# Patient Record
Sex: FEMALE | Race: WHITE | NOT HISPANIC OR LATINO
[De-identification: names, ages, dates, MRNs, and addresses within clinical notes are randomized per-mention and may not be internally consistent; named-entity substitution may affect disease eponyms.]

---
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
## 2024-05-30 ENCOUNTER — LABORATORY RESULT (OUTPATIENT)
Age: 19
End: 2024-05-30

## 2024-05-30 ENCOUNTER — APPOINTMENT (OUTPATIENT)
Dept: PEDIATRICS | Facility: CLINIC | Age: 19
End: 2024-05-30
Payer: COMMERCIAL

## 2024-05-30 VITALS — TEMPERATURE: 98.2 F | WEIGHT: 107.6 LBS

## 2024-05-30 PROCEDURE — 99213 OFFICE O/P EST LOW 20 MIN: CPT

## 2024-05-30 NOTE — DISCUSSION/SUMMARY
[FreeTextEntry1] : Encourage PO intake  Trial Motrin or Advil  Cool compress PRN  Labs  Follow up PRN
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
## 2024-06-01 LAB
25(OH)D3 SERPL-MCNC: 10.2 NG/ML
ALBUMIN SERPL ELPH-MCNC: 4.7 G/DL
ALP BLD-CCNC: 72 U/L
ALT SERPL-CCNC: 15 U/L
ANION GAP SERPL CALC-SCNC: 14 MMOL/L
ASO AB SER LA-ACNC: 81 IU/ML
AST SERPL-CCNC: 23 U/L
BASOPHILS # BLD AUTO: 0.05 K/UL
BASOPHILS NFR BLD AUTO: 0.5 %
BILIRUB SERPL-MCNC: 0.4 MG/DL
BUN SERPL-MCNC: 10 MG/DL
CALCIUM SERPL-MCNC: 9.8 MG/DL
CHLORIDE SERPL-SCNC: 102 MMOL/L
CHOLEST SERPL-MCNC: 153 MG/DL
CO2 SERPL-SCNC: 23 MMOL/L
CREAT SERPL-MCNC: 0.71 MG/DL
EBV EA AB SER IA-ACNC: <5 U/ML
EBV EA AB TITR SER IF: POSITIVE
EBV EA IGG SER QL IA: 323 U/ML
EBV EA IGG SER-ACNC: NEGATIVE
EBV EA IGM SER IA-ACNC: NEGATIVE
EBV PATRN SPEC IB-IMP: NORMAL
EBV VCA IGG SER IA-ACNC: 203 U/ML
EBV VCA IGM SER QL IA: <10 U/ML
EGFR: 126 ML/MIN/1.73M2
EOSINOPHIL # BLD AUTO: 0.38 K/UL
EOSINOPHIL NFR BLD AUTO: 4.1 %
EPSTEIN-BARR VIRUS CAPSID ANTIGEN IGG: POSITIVE
ESTIMATED AVERAGE GLUCOSE: 108 MG/DL
GLUCOSE SERPL-MCNC: 79 MG/DL
HBA1C MFR BLD HPLC: 5.4 %
HCT VFR BLD CALC: 43.2 %
HDLC SERPL-MCNC: 41 MG/DL
HGB BLD-MCNC: 13.4 G/DL
IMM GRANULOCYTES NFR BLD AUTO: 0.3 %
LDLC SERPL CALC-MCNC: 98 MG/DL
LYMPHOCYTES # BLD AUTO: 2.45 K/UL
LYMPHOCYTES NFR BLD AUTO: 26.7 %
MAN DIFF?: NORMAL
MCHC RBC-ENTMCNC: 26.9 PG
MCHC RBC-ENTMCNC: 31 GM/DL
MCV RBC AUTO: 86.6 FL
MONOCYTES # BLD AUTO: 0.45 K/UL
MONOCYTES NFR BLD AUTO: 4.9 %
NEUTROPHILS # BLD AUTO: 5.8 K/UL
NEUTROPHILS NFR BLD AUTO: 63.5 %
NONHDLC SERPL-MCNC: 112 MG/DL
PLATELET # BLD AUTO: 368 K/UL
POTASSIUM SERPL-SCNC: 4.6 MMOL/L
PROT SERPL-MCNC: 7.8 G/DL
RBC # BLD: 4.99 M/UL
RBC # FLD: 12.7 %
SODIUM SERPL-SCNC: 139 MMOL/L
T4 FREE SERPL-MCNC: 1.4 NG/DL
T4 SERPL-MCNC: 8.8 UG/DL
TRIGL SERPL-MCNC: 69 MG/DL
TSH SERPL-ACNC: 2.27 UIU/ML
WBC # FLD AUTO: 9.16 K/UL

## 2024-06-03 LAB — ANA SER IF-ACNC: NEGATIVE

## 2024-06-20 ENCOUNTER — APPOINTMENT (OUTPATIENT)
Dept: PEDIATRIC ORTHOPEDIC SURGERY | Facility: CLINIC | Age: 19
End: 2024-06-20
Payer: COMMERCIAL

## 2024-06-20 DIAGNOSIS — M25.50 PAIN IN UNSPECIFIED JOINT: ICD-10-CM

## 2024-06-20 PROCEDURE — 99203 OFFICE O/P NEW LOW 30 MIN: CPT

## 2024-06-21 PROBLEM — M25.50 JOINT PAIN: Status: ACTIVE | Noted: 2024-05-30

## 2024-06-21 NOTE — ASSESSMENT
[FreeTextEntry1] : 19 yo F with shoulder, wrist, hip, and knee joint pains and positive Mayda Cronin results 6/1/24   Clinical findings were reviewed at length with the patient. Given my examination, at this time, I do not believe that the source of her joint pain is of an orthopedic nature. The recommendation is to follow up with rheumatology for a thorough examination and work up. All questions and concerns were addressed. The patient vocalized understanding and agreement to assessment and treatment plan. We will plan to see SIMON back in clinic on a PRN basis if any orthopedic concerns arise.   Documented by Josee Sweeney acting as a scribe for Dr. Bolden on 06/20/2024.   The above documentation completed by the scribe is an accurate record of both my words and actions.

## 2024-06-21 NOTE — PHYSICAL EXAM
[FreeTextEntry1] : General: WDWN, acting appropriate for age. HEENT: NCAT, Normal conjunctiva Cardio: Appears well perfused, no peripheral edema, brisk cap refill. Lungs: no obvious increased WOB, no audible wheeze heard without use of stethoscope. Abdomen: not examined. Skin: No visible rashes on exposed skin  Gait: normal gait for age without analgia  MSK:  No erythema.  No joint swelling.  There are no palpable masses, warmth, or tenderness in bilateral upper and lower extremities.

## 2024-06-21 NOTE — REVIEW OF SYSTEMS
[Joint Pains] : arthralgias [AM Stiffness] : am stiffness [No Acute Changes] : No acute changes since previous visit [Change in Activity] : no change in activity [Joint Swelling] : no joint swelling

## 2024-06-21 NOTE — HISTORY OF PRESENT ILLNESS
[FreeTextEntry1] : Zulma is a 18-year-old female brought in today by her sister for an initial evaluation of joint pain. Patient reports that for the past two months, she has been waking up with joint pain. It is usually in her shoulders/upper back, wrist and knees. Denies joint swelling. Denies fever, chills, or night sweats. She recently returned from [facility] on 5/27/24. She was experiencing the same symptoms on her trip. She was seen by her pediatrician recently, who ordered comprehensive labwork. They were remarkable for mono. Her pain is worse in the mornings but is recurring throughout the day. On 6/1/24 her bloodwork came back positive for Mayda-Cronin. No known FHx for rheumatological disorders. She complains of wrist, upper back, and left hip pain today. Of note, she is currently attending [employer]. No other concerns or complaints. Here for initial orthopedic evaluation.

## 2024-07-19 ENCOUNTER — APPOINTMENT (OUTPATIENT)
Dept: PEDIATRICS | Facility: CLINIC | Age: 19
End: 2024-07-19

## 2024-07-19 VITALS
WEIGHT: 109.25 LBS | DIASTOLIC BLOOD PRESSURE: 70 MMHG | SYSTOLIC BLOOD PRESSURE: 103 MMHG | TEMPERATURE: 99.5 F | BODY MASS INDEX: 20.36 KG/M2 | HEART RATE: 100 BPM | HEIGHT: 61.5 IN

## 2024-07-19 DIAGNOSIS — Z00.00 ENCOUNTER FOR GENERAL ADULT MEDICAL EXAMINATION W/OUT ABNORMAL FINDINGS: ICD-10-CM

## 2024-07-19 PROCEDURE — 90621 MENB-FHBP VACC 2/3 DOSE IM: CPT

## 2024-07-19 PROCEDURE — 96160 PT-FOCUSED HLTH RISK ASSMT: CPT | Mod: 59

## 2024-07-19 PROCEDURE — 99395 PREV VISIT EST AGE 18-39: CPT | Mod: 25

## 2024-07-19 PROCEDURE — 99173 VISUAL ACUITY SCREEN: CPT | Mod: 59

## 2024-07-19 PROCEDURE — 96127 BRIEF EMOTIONAL/BEHAV ASSMT: CPT | Mod: 59

## 2024-07-19 PROCEDURE — 90471 IMMUNIZATION ADMIN: CPT

## 2024-07-23 LAB
HBV SURFACE AB SERPL IA-ACNC: 279.6 MIU/ML
M TB IFN-G BLD-IMP: NEGATIVE
MEV IGG FLD QL IA: 170 AU/ML
MEV IGG+IGM SER-IMP: POSITIVE
MUV AB SER-ACNC: POSITIVE
MUV IGG SER QL IA: 197 AU/ML
QUANTIFERON TB PLUS MITOGEN MINUS NIL: 1.82 IU/ML
QUANTIFERON TB PLUS NIL: 0.03 IU/ML
QUANTIFERON TB PLUS TB1 MINUS NIL: 0 IU/ML
QUANTIFERON TB PLUS TB2 MINUS NIL: 0 IU/ML
RUBV IGG FLD-ACNC: 0.81 INDEX
RUBV IGG SER-IMP: NEGATIVE
VZV AB TITR SER: POSITIVE
VZV IGG SER IF-ACNC: 470.9 INDEX

## 2024-08-16 ENCOUNTER — APPOINTMENT (OUTPATIENT)
Dept: PEDIATRICS | Facility: CLINIC | Age: 19
End: 2024-08-16

## 2024-08-16 DIAGNOSIS — Z23 ENCOUNTER FOR IMMUNIZATION: ICD-10-CM

## 2024-08-16 PROCEDURE — 90707 MMR VACCINE SC: CPT

## 2024-08-16 PROCEDURE — 90471 IMMUNIZATION ADMIN: CPT

## 2024-09-09 ENCOUNTER — NON-APPOINTMENT (OUTPATIENT)
Age: 19
End: 2024-09-09

## 2024-09-12 ENCOUNTER — APPOINTMENT (OUTPATIENT)
Dept: PEDIATRICS | Facility: CLINIC | Age: 19
End: 2024-09-12

## 2025-05-28 ENCOUNTER — APPOINTMENT (OUTPATIENT)
Dept: PEDIATRICS | Facility: CLINIC | Age: 20
End: 2025-05-28
Payer: COMMERCIAL

## 2025-05-28 DIAGNOSIS — Z23 ENCOUNTER FOR IMMUNIZATION: ICD-10-CM

## 2025-05-28 DIAGNOSIS — Z71.85 ENCOUNTER FOR IMMUNIZATION SAFETY COUNSELING: ICD-10-CM

## 2025-05-28 DIAGNOSIS — Z87.39 PERSONAL HISTORY OF OTHER DISEASES OF THE MUSCULOSKELETAL SYSTEM AND CONNECTIVE TISSUE: ICD-10-CM

## 2025-05-28 PROCEDURE — 86580 TB INTRADERMAL TEST: CPT

## 2025-05-28 PROCEDURE — 90656 IIV3 VACC NO PRSV 0.5 ML IM: CPT

## 2025-05-28 PROCEDURE — 90471 IMMUNIZATION ADMIN: CPT
